# Patient Record
Sex: FEMALE | URBAN - METROPOLITAN AREA
[De-identification: names, ages, dates, MRNs, and addresses within clinical notes are randomized per-mention and may not be internally consistent; named-entity substitution may affect disease eponyms.]

---

## 2023-03-22 ENCOUNTER — OFFICE VISIT (OUTPATIENT)
Dept: URBAN - METROPOLITAN AREA CLINIC 118 | Facility: CLINIC | Age: 38
End: 2023-03-22

## 2023-05-18 ENCOUNTER — LAB OUTSIDE AN ENCOUNTER (OUTPATIENT)
Dept: URBAN - METROPOLITAN AREA CLINIC 118 | Facility: CLINIC | Age: 38
End: 2023-05-18

## 2023-05-18 ENCOUNTER — OFFICE VISIT (OUTPATIENT)
Dept: URBAN - METROPOLITAN AREA CLINIC 118 | Facility: CLINIC | Age: 38
End: 2023-05-18
Payer: COMMERCIAL

## 2023-05-18 VITALS
HEART RATE: 62 BPM | SYSTOLIC BLOOD PRESSURE: 120 MMHG | HEIGHT: 63 IN | TEMPERATURE: 97.3 F | WEIGHT: 190.2 LBS | DIASTOLIC BLOOD PRESSURE: 71 MMHG | BODY MASS INDEX: 33.7 KG/M2

## 2023-05-18 DIAGNOSIS — R10.13 EPIGASTRIC ABDOMINAL PAIN: ICD-10-CM

## 2023-05-18 DIAGNOSIS — K62.5 RECTAL HEMORRHAGE: ICD-10-CM

## 2023-05-18 DIAGNOSIS — D50.0 IRON DEFICIENCY ANEMIA DUE TO CHRONIC BLOOD LOSS: ICD-10-CM

## 2023-05-18 PROBLEM — 724556004: Status: ACTIVE | Noted: 2023-05-18

## 2023-05-18 PROCEDURE — 99204 OFFICE O/P NEW MOD 45 MIN: CPT | Performed by: INTERNAL MEDICINE

## 2023-05-18 RX ORDER — TERBINAFINE HYDROCHLORIDE 250 MG/1
1 TABLET TABLET ORAL ONCE A DAY
Status: ACTIVE | COMMUNITY

## 2023-05-18 RX ORDER — FERROUS SULFATE 325(65) MG
1 TABLET TABLET ORAL
Status: ACTIVE | COMMUNITY

## 2023-05-18 NOTE — HPI-TODAY'S VISIT:
The patient is referred by  for anemia and reflux disease.  Note was sent.  She is a pleasant 37-year-old female who has had anemia for the last 10 years.  This is remitted and relapsed with oral iron therapy.  She has been on iron daily for the last 4 months.  She has had occasional bright red blood per rectum but less than 3 times in the last year.  She has no history of hemorrhoids.  She denies nausea, vomiting, weight loss, or abdominal pain.  She does have reflux disease with spicy foods and associated early satiety.  She has never been treated with blood transfusion or intravenous iron.  She has no family history of gastric or colon cancer.  She denies any prior upper or lower endoscopy.  She denies excess NSAID use.  She does have a history of heavy menstrual cycles which are improved on oral contraceptives.

## 2023-05-19 LAB
ABSOLUTE BASOPHILS: 39
ABSOLUTE EOSINOPHILS: 112
ABSOLUTE LYMPHOCYTES: 1859
ABSOLUTE MONOCYTES: 515
ABSOLUTE NEUTROPHILS: 3074
BASOPHILS: 0.7
EOSINOPHILS: 2
HEMATOCRIT: 39.1
HEMOGLOBIN: 13.3
IRON BIND.CAP.(TIBC): 404
IRON SATURATION: 16
IRON: 66
LYMPHOCYTES: 33.2
MCH: 28.4
MCHC: 34
MCV: 83.5
MONOCYTES: 9.2
MPV: 12.4
NEUTROPHILS: 54.9
PLATELET COUNT: 229
RDW: 13
RED BLOOD CELL COUNT: 4.68
WHITE BLOOD CELL COUNT: 5.6

## 2023-05-23 ENCOUNTER — DASHBOARD ENCOUNTERS (OUTPATIENT)
Age: 38
End: 2023-05-23

## 2023-06-22 ENCOUNTER — OFFICE VISIT (OUTPATIENT)
Dept: URBAN - METROPOLITAN AREA MEDICAL CENTER 16 | Facility: MEDICAL CENTER | Age: 38
End: 2023-06-22

## 2025-08-14 ENCOUNTER — OFFICE VISIT (OUTPATIENT)
Dept: URBAN - METROPOLITAN AREA CLINIC 118 | Facility: CLINIC | Age: 40
End: 2025-08-14